# Patient Record
Sex: MALE | Race: NATIVE HAWAIIAN OR OTHER PACIFIC ISLANDER | NOT HISPANIC OR LATINO | URBAN - METROPOLITAN AREA
[De-identification: names, ages, dates, MRNs, and addresses within clinical notes are randomized per-mention and may not be internally consistent; named-entity substitution may affect disease eponyms.]

---

## 2019-07-16 ENCOUNTER — EMERGENCY (EMERGENCY)
Facility: HOSPITAL | Age: 39
LOS: 1 days | Discharge: HOME | End: 2019-07-16
Attending: EMERGENCY MEDICINE | Admitting: EMERGENCY MEDICINE
Payer: SELF-PAY

## 2019-07-16 VITALS
DIASTOLIC BLOOD PRESSURE: 96 MMHG | RESPIRATION RATE: 18 BRPM | HEART RATE: 71 BPM | WEIGHT: 175.05 LBS | SYSTOLIC BLOOD PRESSURE: 148 MMHG | OXYGEN SATURATION: 99 % | TEMPERATURE: 98 F

## 2019-07-16 DIAGNOSIS — R21 RASH AND OTHER NONSPECIFIC SKIN ERUPTION: ICD-10-CM

## 2019-07-16 DIAGNOSIS — L02.416 CUTANEOUS ABSCESS OF LEFT LOWER LIMB: ICD-10-CM

## 2019-07-16 DIAGNOSIS — M25.562 PAIN IN LEFT KNEE: ICD-10-CM

## 2019-07-16 DIAGNOSIS — L08.9 LOCAL INFECTION OF THE SKIN AND SUBCUTANEOUS TISSUE, UNSPECIFIED: ICD-10-CM

## 2019-07-16 LAB
ALBUMIN SERPL ELPH-MCNC: 4 G/DL — SIGNIFICANT CHANGE UP (ref 3.3–5)
ALP SERPL-CCNC: 88 U/L — SIGNIFICANT CHANGE UP (ref 40–120)
ALT FLD-CCNC: 17 U/L — SIGNIFICANT CHANGE UP (ref 10–45)
ANION GAP SERPL CALC-SCNC: 11 MMOL/L — SIGNIFICANT CHANGE UP (ref 5–17)
AST SERPL-CCNC: 18 U/L — SIGNIFICANT CHANGE UP (ref 10–40)
BASOPHILS # BLD AUTO: 0.03 K/UL — SIGNIFICANT CHANGE UP (ref 0–0.2)
BASOPHILS NFR BLD AUTO: 0.3 % — SIGNIFICANT CHANGE UP (ref 0–2)
BILIRUB SERPL-MCNC: 0.4 MG/DL — SIGNIFICANT CHANGE UP (ref 0.2–1.2)
BUN SERPL-MCNC: 13 MG/DL — SIGNIFICANT CHANGE UP (ref 7–23)
CALCIUM SERPL-MCNC: 9.4 MG/DL — SIGNIFICANT CHANGE UP (ref 8.4–10.5)
CHLORIDE SERPL-SCNC: 106 MMOL/L — SIGNIFICANT CHANGE UP (ref 96–108)
CO2 SERPL-SCNC: 26 MMOL/L — SIGNIFICANT CHANGE UP (ref 22–31)
CREAT SERPL-MCNC: 0.92 MG/DL — SIGNIFICANT CHANGE UP (ref 0.5–1.3)
EOSINOPHIL # BLD AUTO: 0.19 K/UL — SIGNIFICANT CHANGE UP (ref 0–0.5)
EOSINOPHIL NFR BLD AUTO: 2.2 % — SIGNIFICANT CHANGE UP (ref 0–6)
GLUCOSE SERPL-MCNC: 129 MG/DL — HIGH (ref 70–99)
HCT VFR BLD CALC: 43 % — SIGNIFICANT CHANGE UP (ref 39–50)
HGB BLD-MCNC: 14 G/DL — SIGNIFICANT CHANGE UP (ref 13–17)
IMM GRANULOCYTES NFR BLD AUTO: 0.3 % — SIGNIFICANT CHANGE UP (ref 0–1.5)
LYMPHOCYTES # BLD AUTO: 2.71 K/UL — SIGNIFICANT CHANGE UP (ref 1–3.3)
LYMPHOCYTES # BLD AUTO: 31 % — SIGNIFICANT CHANGE UP (ref 13–44)
MCHC RBC-ENTMCNC: 30.7 PG — SIGNIFICANT CHANGE UP (ref 27–34)
MCHC RBC-ENTMCNC: 32.6 GM/DL — SIGNIFICANT CHANGE UP (ref 32–36)
MCV RBC AUTO: 94.3 FL — SIGNIFICANT CHANGE UP (ref 80–100)
MONOCYTES # BLD AUTO: 0.48 K/UL — SIGNIFICANT CHANGE UP (ref 0–0.9)
MONOCYTES NFR BLD AUTO: 5.5 % — SIGNIFICANT CHANGE UP (ref 2–14)
NEUTROPHILS # BLD AUTO: 5.29 K/UL — SIGNIFICANT CHANGE UP (ref 1.8–7.4)
NEUTROPHILS NFR BLD AUTO: 60.7 % — SIGNIFICANT CHANGE UP (ref 43–77)
NRBC # BLD: 0 /100 WBCS — SIGNIFICANT CHANGE UP (ref 0–0)
PLATELET # BLD AUTO: 227 K/UL — SIGNIFICANT CHANGE UP (ref 150–400)
POTASSIUM SERPL-MCNC: 4.6 MMOL/L — SIGNIFICANT CHANGE UP (ref 3.5–5.3)
POTASSIUM SERPL-SCNC: 4.6 MMOL/L — SIGNIFICANT CHANGE UP (ref 3.5–5.3)
PROT SERPL-MCNC: 7 G/DL — SIGNIFICANT CHANGE UP (ref 6–8.3)
RBC # BLD: 4.56 M/UL — SIGNIFICANT CHANGE UP (ref 4.2–5.8)
RBC # FLD: 12.2 % — SIGNIFICANT CHANGE UP (ref 10.3–14.5)
SODIUM SERPL-SCNC: 143 MMOL/L — SIGNIFICANT CHANGE UP (ref 135–145)
WBC # BLD: 8.73 K/UL — SIGNIFICANT CHANGE UP (ref 3.8–10.5)
WBC # FLD AUTO: 8.73 K/UL — SIGNIFICANT CHANGE UP (ref 3.8–10.5)

## 2019-07-16 PROCEDURE — 87040 BLOOD CULTURE FOR BACTERIA: CPT

## 2019-07-16 PROCEDURE — 85025 COMPLETE CBC W/AUTO DIFF WBC: CPT

## 2019-07-16 PROCEDURE — 99284 EMERGENCY DEPT VISIT MOD MDM: CPT

## 2019-07-16 PROCEDURE — 80053 COMPREHEN METABOLIC PANEL: CPT

## 2019-07-16 PROCEDURE — 96374 THER/PROPH/DIAG INJ IV PUSH: CPT

## 2019-07-16 PROCEDURE — 99284 EMERGENCY DEPT VISIT MOD MDM: CPT | Mod: 25

## 2019-07-16 RX ORDER — OXYCODONE AND ACETAMINOPHEN 5; 325 MG/1; MG/1
1 TABLET ORAL ONCE
Refills: 0 | Status: DISCONTINUED | OUTPATIENT
Start: 2019-07-16 | End: 2019-07-16

## 2019-07-16 RX ADMIN — Medication 100 MILLIGRAM(S): at 14:39

## 2019-07-16 RX ADMIN — OXYCODONE AND ACETAMINOPHEN 1 TABLET(S): 5; 325 TABLET ORAL at 14:39

## 2019-07-16 NOTE — ED PROVIDER NOTE - NSFOLLOWUPINSTRUCTIONS_ED_ALL_ED_FT
Return to ED in 2 days.     Abscess    An abscess is an infected area that contains a collection of pus and debris. It can occur in almost any part of the body and occurs when the tissue gets infection. Symptoms include a painful mass that is red, warm, tender that might break open and HAVE drainage. If your health care provider gave you antibiotics make sure to take the full course and do not stop even if feeling better.     SEEK IMMEDIATE MEDICAL CARE IF YOU HAVE ANY OF THE FOLLOWING SYMPTOMS: chills, fever, muscle aches, or red streaking from the area.    Community-Associated MRSA  MRSA stands for methicillin-resistant Staphylococcus aureus. It is a type of infection that is caused by bacteria that no longer respond to common antibiotic medicines (drug-resistant bacteria). MRSA can cause an infection that is hard to treat. There are two types of MRSA infections:  Healthcare-associated (HA-MRSA) is an infection that you get during a stay in a hospital, rehabilitation facility, or nursing home.  Community-associated MRSA (CA-MRSA) is an infection that occurs without healthcare exposure.  CA-MRSA may spread among sports teams,  centers, and other similar settings, and most commonly affects the skin and other soft tissues. MRSA bacteria can be spread from person to person (is contagious). This usually happens when a healthy person touches objects that have the bacteria on them (are contaminated) or comes in direct skin-to-skin contact with an infected person.    What are the causes?  Staphylococcus aureus bacteria normally live on the skin or in the nose of some people. This usually does not cause problems. However, a MRSA infection can happen if the bacteria enters the body through a cut, wound, or break in the skin.    What increases the risk?  The following factors may make you more likely to get this infection:  Close skin-to-skin contact with others.  Cuts and scratches that are not treated, not covered, or both.  Tattoos.  Recent antibiotic medicine use.  Sharing contaminated towels or clothes.  Having active skin conditions.  Participating in contact sports.  Living in crowded settings.  Homelessness.  IV drug use.  Sharing towels, razors, or sports equipment with other people.  What are the signs or symptoms?  Symptoms of this condition usually starts as a scratch or cut that becomes infected. Symptoms may include:  A pus-filled pimple.  A boil on your skin.  Pus draining from your skin.  A sore (abscess) under your skin or somewhere in your body.  Fever with or without chills.  CA-MRSA infections are usually skin infections, but in some cases, severe illness may develop, such as:  Pneumonia.  Bone or joint infections.  Bloodstream infections (sepsis).  Symptoms may vary as the infection gets worse.    How is this diagnosed?  This condition may be diagnosed by:  Taking a sample from the infected area and growing it in a lab (culture). The culture is checked under a microscope to see which type of antibiotic medicine will work to treat it (sensitivity test).  Testing bacteria samples for MRSA genes. This is faster than a culture. Your health care provider may diagnose MRSA using samples from:  Cuts or wounds in infected areas.  Nasal swabs.  Saliva or deep-cough specimens from the lungs (sputum).  Urine.  Blood.  You may also have:  X-rays.  MRI.  CT scan.  Imaging studies to check if the infection has spread to the lungs, bones, or joints. These may include X-rays, MRI, or CT scan.  A culture and sensitivity test of blood or fluids from inside the joints.  How is this treated?  Treatment varies and is based on how serious, deep, and widespread the infection is. Severe infections may require a hospital stay. Some skin infections, such as a small boil or sore (abscess), may be treated by draining pus from the site of the infection. More extensive surgery to drain pus may be necessary for deeper or more widespread soft tissue infections.    After drainage, you may be prescribed antibiotics that are given by mouth (orally) or through a vein. You may start antibiotic treatment right away, or after having sensitivity testing to determine which antibiotic is best for you.    Follow these instructions at home:  Medicines     Image   Take over-the-counter and prescription medicines only as told by your health care provider.  Take your antibiotic medicine as told by your health care provider. Do not stop taking the antibiotic even if you start to feel better.  Lifestyle     Image   Wash your hands often with soap and water. Ask anyone who lives with you to wash his or her hands often, too. If soap and water are not available, use hand .  Do not use towels, razors, toothbrushes, bedding, or other items that will be used by others.  Avoid close contact with others as much as possible.  Wash towels, bedding, and clothes in the washing machine with detergent and hot water. Dry them in a hot dryer.  Always shower after exercising.  General instructions     If you have a wound, follow instructions from your health care provider about how to take care of your wound. Make sure you:  Wash your hands with soap and water before you change your bandage (dressing). If soap and water are not available, use hand .  Change your dressing as told by your health care provider, if you have one.  Leave any stitches (sutures), skin glue, or adhesive strips in place. These skin closures may need to be in place for 2 weeks or longer. If adhesive strip edges start to loosen and curl up, you may trim the loose edges. Do not remove adhesive strips completely unless your health care provider tells you to do that.  Tell all health care providers who care for you that you have MRSA.  Keep all follow-up visits as told by your health care provider. This is important.  How is this prevented?  Only take antibiotics as prescribed by your health care provider, and only take them when absolutely necessary.  Wash your hands frequently with soap and water. If soap and water are not available, use an alcohol-based hand . Dry your hands with a clean or disposable towel. Make sure that all people in your household wash their hands, too.  Wash and dry your clothes and bedding at the warmest temperatures that are recommended on the labels.  Maintain good hygiene by bathing often and keeping your body clean.  Clean wounds, cuts, and abrasions with soap and water and cover them with dry, germ-free (sterile) dressings until they heal.  If you have a wound that seems to be infected, ask your health care provider if a culture should be done for MRSA and other bacteria.  If you are breastfeeding, talk to your health care provider about MRSA. You may be asked to temporarily stop breastfeeding.  Contact a health care provider if:  Your infection seems to be getting worse. Signs may include:  Warmth, redness, or tenderness around your wound site.  A red line that spreads from your infection site.  A dark color in the area around your infection.  Wound drainage that is tan, yellow, or green.  A bad smell coming from your wound.  You have a fever.  Get help right away if:  You have trouble breathing.  You feel nauseous, you vomit, or you cannot take medicine without vomiting.  You have chest pain.  Summary  MRSA stands for methicillin-resistant Staphylococcus aureus. It is a type of infection that is caused by bacteria that no longer respond to common antibiotic medicines (drug-resistant bacteria).  MRSA can cause an infection that is hard to treat.  Treatment varies and is based on how serious, deep, and widespread the infection is.  This information is not intended to replace advice given to you by your health care provider. Make sure you discuss any questions you have with your health care provider.

## 2019-07-16 NOTE — ED PROVIDER NOTE - NSFOLLOWUPCLINICS_GEN_ALL_ED_FT
[facility] - Emergency Department  Emergency Medicine  [address] [place] [address]  Phone: [phone]  Fax:   Follow Up Time:

## 2019-07-16 NOTE — ED PROVIDER NOTE - ATTENDING CONTRIBUTION TO CARE
39 yo M with PMH of HIV (CD4 unknown, VL undetectable, on meds) p/w rash x 1 week. Pt reports starting with a small pimple on his L knee at the beginning of last week. Pt then started noticing pimples with whiteheads on his legs and buttocks. 4 days ago he developed a pimple on his L pinky finger that gradually increased in size with pain and then yesterday developed one on his R 4th finger and a painful lump under his axilla. Pt went to an STD clinic this morning and was told it was a staph infection and told to come to the ED. Pt was treated prophylactically for GC with ceftriaxone and zithromax and an RPR was sent. Reports fever of 101 last night. Denies chills, cough, bodyaches, joint pains, new medications, soaps, detergents, lotions, sick contacts or recent travel. Pt Afebrile. AAO, NAD, +scattered pustules to legs. L 5th finger +pustule to dorsum with erythema, pus expressed. R 4th finger + pustule. R axilla +scatter papules with one larger 3cm induration and tenderness. Labs wnl. Will cover with clindamycin for MRSA, advised warm soaks under R axilla, advised to return in 2 days for wound check.

## 2019-07-16 NOTE — ED ADULT NURSE NOTE - OBJECTIVE STATEMENT
Presents to ED for body rash.  Patient was seen at urgent care, given Doxycycline and referred to ED for further eval because of history of HIV.  He noticed 1.5 wks ago, a "bump" on his left knee, the lesion increased in size, with redness and edema and mild discharge and then resolved.  A few days later similar lesions appeared on his fingers, right axillary area and groin area.  Subjective fever last night with chills.  No nausea, vomiting or urinary sx.  History of HIV.  Appears comfortable and in no apparent distress.

## 2019-07-16 NOTE — ED PROVIDER NOTE - PHYSICAL EXAMINATION
CONSTITUTIONAL: Well-appearing; well-nourished; in no apparent distress.   HEAD: Normocephalic; atraumatic.   EYES: PERRL; EOM intact; conjunctiva and sclera clear  ENT: normal nose; no rhinorrhea; normal pharynx with no erythema or lesions.   NECK: Supple; non-tender;   CARDIOVASCULAR: Normal S1, S2; no murmurs, rubs, or gallops. Regular rate and rhythm.   RESPIRATORY: Breathing easily; breath sounds clear and equal bilaterally; no wheezes, rhonchi, or rales.  MSK: FROM at all extremities, normal tone   EXT: No cyanosis or edema; N/V intact  SKIN: +scattered pustules to legs. L 5th finger +pustule to dorsum with erythema, pus expressed. R 4th finger + pustule. R axilla +scatter papules with one larger 3cm induration and tenderness

## 2019-07-16 NOTE — ED PROVIDER NOTE - OBJECTIVE STATEMENT
37 yo M with pmh of HIV (CD4 unknown, VL undetectable) c/o rash x 1 week. Pt reports starting with a small pimple on his L knee at the beginning of last week. Pt then started noticing pimples with whiteheads on his legs and buttocks. 4 days ago he developed a pimple on his L pinky finger and then yesterday developed one on his R 4th finger and a lump under his axilla. Pt went to an STD clinic this morning and was told it was a staph infection and told to come to the ED. Reports fever of 101 last night. Denies chills, cough, bodyaches, new medications, soaps, detergents, lotions, sick contacts or recent travel. 39 yo M with pmh of HIV (CD4 unknown, VL undetectable) c/o rash x 1 week. Pt reports starting with a small pimple on his L knee at the beginning of last week. Pt then started noticing pimples with whiteheads on his legs and buttocks. 4 days ago he developed a pimple on his L pinky finger that gradually increased in size with pain and then yesterday developed one on his R 4th finger and a painful lump under his axilla. Pt went to an STD clinic this morning and was told it was a staph infection and told to come to the ED. Pt was treated prophylactically for GC with ceftriaxone and zithromax and an RPR was sent. Reports fever of 101 last night. Denies chills, cough, bodyaches, joint pains, new medications, soaps, detergents, lotions, sick contacts or recent travel.

## 2019-07-18 ENCOUNTER — EMERGENCY (EMERGENCY)
Facility: HOSPITAL | Age: 39
LOS: 1 days | Discharge: HOME | End: 2019-07-18
Admitting: EMERGENCY MEDICINE
Payer: SELF-PAY

## 2019-07-18 VITALS
TEMPERATURE: 98 F | OXYGEN SATURATION: 97 % | RESPIRATION RATE: 16 BRPM | WEIGHT: 175.05 LBS | SYSTOLIC BLOOD PRESSURE: 161 MMHG | HEART RATE: 69 BPM | DIASTOLIC BLOOD PRESSURE: 90 MMHG

## 2019-07-18 DIAGNOSIS — Z48.01 ENCOUNTER FOR CHANGE OR REMOVAL OF SURGICAL WOUND DRESSING: ICD-10-CM

## 2019-07-18 PROCEDURE — 99212 OFFICE O/P EST SF 10 MIN: CPT

## 2019-07-18 NOTE — ED PROVIDER NOTE - CARE PROVIDER_API CALL
Rosy Taylor)  Internal Medicine  [address] [address]  Phone: [phone]  Fax: [phone]  Follow Up Time:

## 2019-07-18 NOTE — ED ADULT NURSE NOTE - OBJECTIVE STATEMENT
f/u abscess to right axillary and left 5th finger. As per IOANA Gunn, wound is healing and denies fever or any other complaints.

## 2019-07-18 NOTE — ED PROVIDER NOTE - NSFOLLOWUPCLINICS_GEN_ALL_ED_FT
[facility]  Family Medicine  [facility]  [place], [place] Replaced by [facility]  Phone: [phone]  Fax:   Follow Up Time:

## 2019-07-18 NOTE — ED PROVIDER NOTE - CLINICAL SUMMARY MEDICAL DECISION MAKING FREE TEXT BOX
37 yo male with h/o HIV ( viral load undetectable) in the Er for a wound check. pt was seen in the ER 2 days ago, had multiple small pustules on his skin, some of them was draining pus. Pt was d/cyril home with PO Clindamycin that he has been compliant with.. Pt reports improvement as his all pustules are drying up and decreased in size significantly. Pt denies any fever or chills and has no acute complaints.   D//c home stable, recommend to finish meds as prescribed.

## 2019-07-18 NOTE — ED ADULT TRIAGE NOTE - OTHER COMPLAINTS
reports "staph infection" to under right axilla, right 4th digit and left 5th digit" pt verbalized improvement. no fevers nor chills.

## 2019-07-18 NOTE — ED PROVIDER NOTE - OBJECTIVE STATEMENT
38-year-old male with a PMHx of HIV, presenting to the ED for wound check. Pt states he was at [facility] 2 days ago 38-year-old male with a PMHx of HIV, presenting to the ED for wound check. Pt states he was at [facility] 2 days ago c/o rash and pimples. Pt states he went to a sexual health clinic in which he was referred to the ED for a suspected staph infection. Pt reports being given Clindamycin and taking it as prescribed. He also reports being tested negative for Syphilis.

## 2019-07-18 NOTE — ED PROVIDER NOTE - NSFOLLOWUPINSTRUCTIONS_ED_ALL_ED_FT
I have discussed the discharge plan with the patient. The patient agrees with the plan, as discussed.  The patient understands Emergency Department diagnosis is a preliminary diagnosis often based on limited information and that the patient must adhere to the follow-up plan as discussed.  The patient understands that if the symptoms worsen or if prescribed medications do not have the desired/planned effect that the patient may return to the Emergency Department at any time for further evaluation and treatment.      Skin Abscess  Image   A skin abscess is an infected area of your skin that contains pus and other material. An abscess can happen in any part of your body. Some abscesses break open (rupture) on their own. Most continue to get worse unless they are treated. The infection can spread deeper into the body and into your blood, which can make you feel sick.    A skin abscess is caused by germs that enter the skin through a cut or scrape. It can also be caused by blocked oil and sweat glands or infected hair follicles.    This condition is usually treated by:  Draining the pus.  Taking antibiotic medicines.   Placing a warm, wet washcloth over the abscess.  Follow these instructions at home:  Medicines     Image   Take over-the-counter and prescription medicines only as told by your doctor.  If you were prescribed an antibiotic medicine, take it as told by your doctor. Do not stop taking the antibiotic even if you start to feel better.  Abscess care     Image   If you have an abscess that has not drained, place a warm, clean, wet washcloth over the abscess several times a day. Do this as told by your doctor.  Follow instructions from your doctor about how to take care of your abscess. Make sure you:  Cover the abscess with a bandage (dressing).  Change your bandage or gauze as told by your doctor.  Wash your hands with soap and water before you change the bandage or gauze. If you cannot use soap and water, use hand .  Check your abscess every day for signs that the infection is getting worse. Check for:  More redness, swelling, or pain.  More fluid or blood.  Warmth.  More pus or a bad smell.  General instructions     To avoid spreading the infection:  Do not share personal care items, towels, or hot tubs with others.  Avoid making skin-to-skin contact with other people.  Keep all follow-up visits as told by your doctor. This is important.  Contact a doctor if:  You have more redness, swelling, or pain around your abscess.  You have more fluid or blood coming from your abscess.  Your abscess feels warm when you touch it.  You have more pus or a bad smell coming from your abscess.  You have a fever.  Your muscles ache.  You have chills.  You feel sick.  Get help right away if:  You have very bad (severe) pain.  You see red streaks on your skin spreading away from the abscess.  Summary  A skin abscess is an infected area of your skin that contains pus and other material.  The abscess is caused by germs that enter the skin through a cut or scrape. It can also be caused by blocked oil and sweat glands or infected hair follicles.  Follow your doctor's instructions on caring for your abscess, taking medicines, preventing infections, and keeping follow-up visits.  This information is not intended to replace advice given to you by your health care provider. Make sure you discuss any questions you have with your health care provider.

## 2019-07-21 LAB
CULTURE RESULTS: SIGNIFICANT CHANGE UP
CULTURE RESULTS: SIGNIFICANT CHANGE UP
SPECIMEN SOURCE: SIGNIFICANT CHANGE UP
SPECIMEN SOURCE: SIGNIFICANT CHANGE UP

## 2019-09-07 NOTE — ED ADULT NURSE NOTE - NSFALLRSKASSESSDT_ED_ALL_ED
16-Jul-2019 13:37 no shortness of breath/no fever/no syncope/no congestion/no back pain/no chills/no nausea/no vomiting

## 2020-08-27 NOTE — ED PROVIDER NOTE - CLINICAL SUMMARY MEDICAL DECISION MAKING FREE TEXT BOX
NEGATIVE 39 yo M with pmh of HIV (CD4 unknown, VL undetectable) c/o rash x 1 week. Pt reports starting with a small pimple on his L knee at the beginning of last week. Pt then started noticing pimples with whiteheads on his legs and buttocks. 4 days ago he developed a pimple on his L pinky finger and then yesterday developed one on his R 4th finger and a lump under his axilla.  Fever last night. Afebrile. +scattered pustules to legs. L 5th finger +pustule to dorsum with erythema, pus expressed. R 4th finger + pustule. R axilla +scatter papules with one larger 3cm induration and tenderness 39 yo M with pmh of HIV (CD4 unknown, VL undetectable) c/o rash x 1 week. Pt reports starting with a small pimple on his L knee at the beginning of last week. Pt then started noticing pimples with whiteheads on his legs and buttocks. 4 days ago he developed a pimple on his L pinky finger and then yesterday developed one on his R 4th finger and a lump under his axilla.  Fever last night. Afebrile. +scattered pustules to legs. L 5th finger +pustule to dorsum with erythema, pus expressed. R 4th finger + pustule. R axilla +scatter papules with one larger 3cm induration and tenderness. Labs wnl. Will cover with clindamycin for MRSA, advised warm soaks under R axilla, advised to return in 2 days for wound check

## 2021-02-02 ENCOUNTER — OFFICE VISIT (OUTPATIENT)
Dept: OTOLARYNGOLOGY | Age: 41
End: 2021-02-02
Payer: COMMERCIAL

## 2021-02-02 VITALS
WEIGHT: 196 LBS | RESPIRATION RATE: 14 BRPM | TEMPERATURE: 97.6 F | HEART RATE: 78 BPM | DIASTOLIC BLOOD PRESSURE: 78 MMHG | SYSTOLIC BLOOD PRESSURE: 138 MMHG | OXYGEN SATURATION: 98 %

## 2021-02-02 DIAGNOSIS — H93.292 ABNORMAL AUDITORY PERCEPTION OF LEFT EAR: ICD-10-CM

## 2021-02-02 DIAGNOSIS — H72.92 PERFORATION OF LEFT TYMPANIC MEMBRANE: Primary | ICD-10-CM

## 2021-02-02 DIAGNOSIS — H91.92 HEARING LOSS OF LEFT EAR, UNSPECIFIED HEARING LOSS TYPE: ICD-10-CM

## 2021-02-02 DIAGNOSIS — H92.02 LEFT EAR PAIN: ICD-10-CM

## 2021-02-02 PROCEDURE — 99211 OFF/OP EST MAY X REQ PHY/QHP: CPT

## 2021-02-02 PROCEDURE — 99204 OFFICE O/P NEW MOD 45 MIN: CPT | Performed by: OTOLARYNGOLOGY

## 2021-02-02 RX ORDER — POLYMYXIN B SULFATE, BACITRACIN ZINC AND NEOMYCIN SULFATE 400; 3.5; 5 [USP'U]/G; MG/G; [USP'U]/G
OINTMENT TOPICAL
COMMUNITY

## 2021-02-02 SDOH — HEALTH STABILITY: MENTAL HEALTH: HOW OFTEN DO YOU HAVE A DRINK CONTAINING ALCOHOL?: NOT ASKED

## 2021-02-02 NOTE — PROGRESS NOTES
2021 11:13 AM VARGHESE Lara (:  1980) is a 38 y.o. male,New patient, here for evaluation of the following chief complaint(s):  Hearing Loss (left ear  10 day duration door hit back of ear)      ASSESSMENT/PLAN:  1. Perforation of left tympanic membrane    2. Abnormal auditory perception of left ear    3. Left ear pain    4. Hearing loss of left ear, unspecified hearing loss type      1. Perforation of left tympanic membrane  -     External Referral To Audiology  2. Abnormal auditory perception of left ear  -     External Referral To Audiology  3. Left ear pain  -     External Referral To Audiology  4. Hearing loss of left ear, unspecified hearing loss type  -     External Referral To Audiology    Comprehensive audiogram  Follow-up to review results  Discussed that tympanic membrane perforations have a good success of spontaneously healing. The rolled edges appear to be in that process of the larger anterior perforation. Very rare to have a tympanic membrane perforation of the size related to blunt head trauma. Discussed that if the perforation does not spontaneously resolve, will refer to ear surgeon for tympanoplasty which should resolve any sort of subjective and objective hearing loss issues that persist.    No follow-ups on file. SUBJECTIVE/OBJECTIVE:  HPI  44-year-old man with a history of left blunt head trauma 10 days ago. Several years ago he had a fall onto the left head that was associated with a change in his hearing but this resolved in a few days. He states that the recent head trauma was related to a door falling onto the head. He describes being stunned and having associated pain but did not lose consciousness. The pain has significantly improved but the muffled hearing is stable. He notices an echo on the left side and has difficulty hearing clients at work. He states when he pops his ears the hearing temporarily improves.   Has a history of vertigo but none

## 2021-06-01 ENCOUNTER — OFFICE VISIT (OUTPATIENT)
Dept: PRIMARY CARE CLINIC | Age: 41
End: 2021-06-01
Payer: COMMERCIAL

## 2021-06-01 VITALS
HEART RATE: 87 BPM | OXYGEN SATURATION: 99 % | DIASTOLIC BLOOD PRESSURE: 74 MMHG | WEIGHT: 195.9 LBS | SYSTOLIC BLOOD PRESSURE: 140 MMHG | RESPIRATION RATE: 16 BRPM | TEMPERATURE: 98.2 F

## 2021-06-01 DIAGNOSIS — R21 RASH: Primary | ICD-10-CM

## 2021-06-01 PROCEDURE — G8427 DOCREV CUR MEDS BY ELIG CLIN: HCPCS | Performed by: PHYSICIAN ASSISTANT

## 2021-06-01 PROCEDURE — 99202 OFFICE O/P NEW SF 15 MIN: CPT | Performed by: PHYSICIAN ASSISTANT

## 2021-06-01 PROCEDURE — G8421 BMI NOT CALCULATED: HCPCS | Performed by: PHYSICIAN ASSISTANT

## 2021-06-01 PROCEDURE — 4004F PT TOBACCO SCREEN RCVD TLK: CPT | Performed by: PHYSICIAN ASSISTANT

## 2021-06-01 RX ORDER — TRIAMCINOLONE ACETONIDE 1 MG/G
CREAM TOPICAL 3 TIMES DAILY
Qty: 80 G | Refills: 0 | Status: SHIPPED | OUTPATIENT
Start: 2021-06-01

## 2021-06-01 RX ORDER — PERMETHRIN 50 MG/G
CREAM TOPICAL
Qty: 2 TUBE | Refills: 0 | Status: SHIPPED | OUTPATIENT
Start: 2021-06-01

## 2021-06-01 ASSESSMENT — ENCOUNTER SYMPTOMS: SHORTNESS OF BREATH: 0

## 2021-06-25 ENCOUNTER — OFFICE VISIT (OUTPATIENT)
Dept: PRIMARY CARE CLINIC | Age: 41
End: 2021-06-25
Payer: COMMERCIAL

## 2021-06-25 VITALS
RESPIRATION RATE: 16 BRPM | OXYGEN SATURATION: 96 % | HEART RATE: 86 BPM | WEIGHT: 184.8 LBS | BODY MASS INDEX: 26.45 KG/M2 | TEMPERATURE: 98.4 F | SYSTOLIC BLOOD PRESSURE: 100 MMHG | DIASTOLIC BLOOD PRESSURE: 64 MMHG | HEIGHT: 70 IN

## 2021-06-25 DIAGNOSIS — J40 BRONCHITIS: Primary | ICD-10-CM

## 2021-06-25 PROCEDURE — 99213 OFFICE O/P EST LOW 20 MIN: CPT | Performed by: NURSE PRACTITIONER

## 2021-06-25 PROCEDURE — G8419 CALC BMI OUT NRM PARAM NOF/U: HCPCS | Performed by: NURSE PRACTITIONER

## 2021-06-25 PROCEDURE — 4004F PT TOBACCO SCREEN RCVD TLK: CPT | Performed by: NURSE PRACTITIONER

## 2021-06-25 PROCEDURE — G8427 DOCREV CUR MEDS BY ELIG CLIN: HCPCS | Performed by: NURSE PRACTITIONER

## 2021-06-25 RX ORDER — AZITHROMYCIN 250 MG/1
TABLET, FILM COATED ORAL
Qty: 1 PACKET | Refills: 0 | Status: SHIPPED | OUTPATIENT
Start: 2021-06-25 | End: 2021-06-30

## 2021-06-25 RX ORDER — ALBUTEROL SULFATE 90 UG/1
2 AEROSOL, METERED RESPIRATORY (INHALATION) EVERY 6 HOURS PRN
Qty: 1 INHALER | Refills: 0 | Status: SHIPPED | OUTPATIENT
Start: 2021-06-25 | End: 2021-06-30 | Stop reason: SDUPTHER

## 2021-06-25 SDOH — ECONOMIC STABILITY: FOOD INSECURITY: WITHIN THE PAST 12 MONTHS, YOU WORRIED THAT YOUR FOOD WOULD RUN OUT BEFORE YOU GOT MONEY TO BUY MORE.: NEVER TRUE

## 2021-06-25 SDOH — ECONOMIC STABILITY: FOOD INSECURITY: WITHIN THE PAST 12 MONTHS, THE FOOD YOU BOUGHT JUST DIDN'T LAST AND YOU DIDN'T HAVE MONEY TO GET MORE.: NEVER TRUE

## 2021-06-25 ASSESSMENT — PATIENT HEALTH QUESTIONNAIRE - PHQ9
SUM OF ALL RESPONSES TO PHQ QUESTIONS 1-9: 0
1. LITTLE INTEREST OR PLEASURE IN DOING THINGS: 0
SUM OF ALL RESPONSES TO PHQ9 QUESTIONS 1 & 2: 0
2. FEELING DOWN, DEPRESSED OR HOPELESS: 0

## 2021-06-25 ASSESSMENT — ENCOUNTER SYMPTOMS
SHORTNESS OF BREATH: 1
VOMITING: 0
NAUSEA: 0
COUGH: 1
DIARRHEA: 0
WHEEZING: 0
SORE THROAT: 0
RHINORRHEA: 0

## 2021-06-25 ASSESSMENT — SOCIAL DETERMINANTS OF HEALTH (SDOH): HOW HARD IS IT FOR YOU TO PAY FOR THE VERY BASICS LIKE FOOD, HOUSING, MEDICAL CARE, AND HEATING?: NOT HARD AT ALL

## 2021-06-25 NOTE — PROGRESS NOTES
[address] [facility]  [address] [place] [address], [id number]  [phone]      HPI:     Cough  This is a new problem. The current episode started in the past 7 days. The problem has been unchanged. The problem occurs every few minutes. The cough is non-productive. Associated symptoms include nasal congestion and shortness of breath. Pertinent negatives include no chest pain, ear pain, fever, headaches, postnasal drip, rhinorrhea, sore throat or wheezing. The symptoms are aggravated by exercise (smoking). Risk factors for lung disease include smoking/tobacco exposure. He has tried OTC cough suppressant for the symptoms. The treatment provided mild relief. Current Outpatient Medications   Medication Sig Dispense Refill    azithromycin (ZITHROMAX Z-KAYLEIGH) 250 MG tablet Two pills daily first day, then one pill daily for 4 days. Take with food. 1 packet 0    albuterol sulfate HFA (PROVENTIL HFA) 108 (90 Base) MCG/ACT inhaler Inhale 2 puffs into the lungs every 6 hours as needed for Wheezing or Shortness of Breath 1 Inhaler 0    triamcinolone (KENALOG) 0.1 % cream Apply topically 3 times daily 80 g 0    permethrin (ELIMITE) 5 % cream Apply head to toe. Rinse 12 hours later. Repeat in 1 week. 2 Tube 0    emtricitabine-rilpivirine-tenofovir alafenamide (ODEFSEY) 200-25-25 MG TABS per tablet Odefsey 200 mg-25 mg-25 mg tablet   TAKE ONE TABLET BY MOUTH ONCE DAILY WITH A MEAL. STORE IN ORIGINAL CONTAINER AT ROOM TEMPERATURE. No current facility-administered medications for this visit. No Known Allergies    All patients pastmedical, surgical, social and family history has been reviewed. Subjective:      Review of Systems   Constitutional: Negative for activity change, appetite change and fever. HENT: Negative for congestion, ear pain, postnasal drip, rhinorrhea and sore throat. Respiratory: Positive for cough and shortness of breath. Negative for wheezing.     Cardiovascular: health maintenance.      Electronically signed by MICHAEL Ivan CNP, CNP on 6/25/2021 at 2:42 PM

## 2021-06-30 RX ORDER — ALBUTEROL SULFATE 90 UG/1
2 AEROSOL, METERED RESPIRATORY (INHALATION) EVERY 6 HOURS PRN
Qty: 1 INHALER | Refills: 0 | Status: SHIPPED | OUTPATIENT
Start: 2021-06-30

## 2021-06-30 NOTE — TELEPHONE ENCOUNTER
Per phone call sent in my chart message, abluterol inhaler broke will you send in RX to [address] [place] [address]. Rossy Card is out of office.

## 2023-08-03 NOTE — ED ADULT NURSE NOTE - CAS DISCH TRANSFER METHOD
Private car Topical Metronidazole Pregnancy And Lactation Text: This medication is Pregnancy Category B and considered safe during pregnancy.  It is also considered safe to use while breastfeeding.

## 2023-08-16 NOTE — ED PROVIDER NOTE - WOUND TYPE
- CALPROTECTIN, FECAL BY IMMUNOASSAY; Future  - TOXIGENIC CLOSTRIDIUM DIFFICILE BY LFA WITH REFLEX TO PCR, STOOL; Future  - GASTROINTESTINAL PATHOGENS PANEL BY PCR; Future  - FAT, FECAL QUALITATIVE; Future       RASH No